# Patient Record
(demographics unavailable — no encounter records)

---

## 2024-11-05 NOTE — PHYSICAL EXAM
[de-identified] : well healed scar [Midline] : located in midline position [Normal] : orientation to person, place, and time: normal

## 2025-03-18 NOTE — ASSESSMENT
[FreeTextEntry1] : s/p Thyroid lobectomy daily care labs in office consider kenalog injections pt to defer f/u 6 months sooner if needed sono next visit

## 2025-03-18 NOTE — PHYSICAL EXAM
[de-identified] : Hypertrophic scar [Midline] : located in midline position [Normal] : orientation to person, place, and time: normal

## 2025-04-17 NOTE — PHYSICAL EXAM
[de-identified] : Hypertrophic scar [Midline] : located in midline position [Normal] : orientation to person, place, and time: normal

## 2025-04-17 NOTE — ASSESSMENT
[FreeTextEntry1] : s/p Thyroid lobectomy Hypertrophic scar daily care Kenalog 10 .4 cc Lot [id number] NDC 8055-0960-69 exp 5/27 f/u 2 months

## 2025-06-10 NOTE — ASSESSMENT
[FreeTextEntry1] : s/p thyroid lobectomy Hypertrophic scar daily care Kenalog 10 NDC 7105-6515-83 exp 3/26 lot [id number] exp 3/26 f/u Endo f/u 3 months

## 2025-06-10 NOTE — HISTORY OF PRESENT ILLNESS
[de-identified] : Pt 8 months s/p thyroid lobectomy for NIFTP here for kenalog injection to scar. Pt is scheduled to have sonogram tomorrow and colonoscopy in August

## 2025-06-10 NOTE — PHYSICAL EXAM
[de-identified] : Hypertrophic scar [Midline] : located in midline position [Normal] : orientation to person, place, and time: normal